# Patient Record
Sex: FEMALE | Race: WHITE | NOT HISPANIC OR LATINO | URBAN - METROPOLITAN AREA
[De-identification: names, ages, dates, MRNs, and addresses within clinical notes are randomized per-mention and may not be internally consistent; named-entity substitution may affect disease eponyms.]

---
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
## 2025-06-21 ENCOUNTER — HOSPITAL ENCOUNTER (EMERGENCY)
Facility: HOSPITAL | Age: 20
Discharge: HOME | End: 2025-06-21
Payer: COMMERCIAL

## 2025-06-21 VITALS
HEIGHT: 66 IN | BODY MASS INDEX: 38.89 KG/M2 | WEIGHT: 242 LBS | HEART RATE: 101 BPM | SYSTOLIC BLOOD PRESSURE: 138 MMHG | RESPIRATION RATE: 18 BRPM | OXYGEN SATURATION: 96 % | TEMPERATURE: 98.6 F | DIASTOLIC BLOOD PRESSURE: 89 MMHG

## 2025-06-21 DIAGNOSIS — Z23 IMMUNIZATION, TETANUS-DIPHTHERIA: ICD-10-CM
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
## 2025-06-21 DIAGNOSIS — S51.852A CAT BITE OF LEFT FOREARM, INITIAL ENCOUNTER: Primary | ICD-10-CM

## 2025-06-21 PROCEDURE — 90472 IMMUNIZATION ADMIN EACH ADD: CPT | Performed by: NURSE PRACTITIONER

## 2025-06-21 PROCEDURE — 99282 EMERGENCY DEPT VISIT SF MDM: CPT

## 2025-06-21 PROCEDURE — 99283 EMERGENCY DEPT VISIT LOW MDM: CPT | Mod: 25

## 2025-06-21 PROCEDURE — 2500000001 HC RX 250 WO HCPCS SELF ADMINISTERED DRUGS (ALT 637 FOR MEDICARE OP): Performed by: NURSE PRACTITIONER

## 2025-06-21 PROCEDURE — 90675 RABIES VACCINE IM: CPT | Mod: JZ | Performed by: NURSE PRACTITIONER

## 2025-06-21 PROCEDURE — 90377 RABIES IG HT&SOL HUMAN IM/SC: CPT | Mod: JW | Performed by: NURSE PRACTITIONER

## 2025-06-21 PROCEDURE — 96372 THER/PROPH/DIAG INJ SC/IM: CPT

## 2025-06-21 PROCEDURE — 90471 IMMUNIZATION ADMIN: CPT | Performed by: NURSE PRACTITIONER

## 2025-06-21 PROCEDURE — 90715 TDAP VACCINE 7 YRS/> IM: CPT | Performed by: NURSE PRACTITIONER

## 2025-06-21 PROCEDURE — 2500000004 HC RX 250 GENERAL PHARMACY W/ HCPCS (ALT 636 FOR OP/ED): Mod: JZ | Performed by: NURSE PRACTITIONER

## 2025-06-21 PROCEDURE — 2500000005 HC RX 250 GENERAL PHARMACY W/O HCPCS: Performed by: NURSE PRACTITIONER

## 2025-06-21 RX ORDER — CLINDAMYCIN HYDROCHLORIDE 150 MG/1
300 CAPSULE ORAL 3 TIMES DAILY
Qty: 60 CAPSULE | Refills: 0 | Status: SHIPPED | OUTPATIENT
Start: 2025-06-21 | End: 2025-07-01

## 2025-06-21 RX ORDER — CIPROFLOXACIN 500 MG/1
500 TABLET, FILM COATED ORAL ONCE
Status: COMPLETED | OUTPATIENT
Start: 2025-06-21 | End: 2025-06-21

## 2025-06-21 RX ORDER — IBUPROFEN 600 MG/1
600 TABLET, FILM COATED ORAL ONCE
Status: COMPLETED | OUTPATIENT
Start: 2025-06-21 | End: 2025-06-21

## 2025-06-21 RX ORDER — CLINDAMYCIN HYDROCHLORIDE 150 MG/1
300 CAPSULE ORAL ONCE
Status: COMPLETED | OUTPATIENT
Start: 2025-06-21 | End: 2025-06-21

## 2025-06-21 RX ORDER — CIPROFLOXACIN 500 MG/1
500 TABLET, FILM COATED ORAL 2 TIMES DAILY
Qty: 20 TABLET | Refills: 0 | Status: SHIPPED | OUTPATIENT
Start: 2025-06-21 | End: 2025-07-01

## 2025-06-21 RX ORDER — BACITRACIN ZINC 500 UNIT/G
1 OINTMENT IN PACKET (EA) TOPICAL ONCE
Status: COMPLETED | OUTPATIENT
Start: 2025-06-21 | End: 2025-06-21

## 2025-06-21 RX ADMIN — CIPROFLOXACIN 500 MG: 500 TABLET ORAL at 14:31

## 2025-06-21 RX ADMIN — RABIES VACCINE 1 ML: KIT at 14:52

## 2025-06-21 RX ADMIN — IBUPROFEN 600 MG: 600 TABLET ORAL at 14:31

## 2025-06-21 RX ADMIN — CLINDAMYCIN HYDROCHLORIDE 300 MG: 150 CAPSULE ORAL at 14:31

## 2025-06-21 RX ADMIN — TETANUS TOXOID, REDUCED DIPHTHERIA TOXOID AND ACELLULAR PERTUSSIS VACCINE, ADSORBED 0.5 ML: 5; 2.5; 8; 8; 2.5 SUSPENSION INTRAMUSCULAR at 14:31

## 2025-06-21 RX ADMIN — HUMAN RABIES VIRUS IMMUNE GLOBULIN 2175 UNITS: 150 INJECTION, SOLUTION INTRAMUSCULAR at 14:53

## 2025-06-21 RX ADMIN — BACITRACIN 1 APPLICATION: 500 OINTMENT TOPICAL at 15:25

## 2025-06-21 NOTE — ED PROVIDER NOTES
Limitations to History: None     HPI:      Tamanna Mason is a 20 y.o. who is otherwise healthy with unknown Tdap status presenting to ED today from work for evaluation of a cat bite.  Patient is a , she was attempting to x-ray a cat at work earlier today and she sustained a bite to the left distal forearm radial side.  Patient reports that although the cat is a house cat, it is overdue for rabies vaccine, last rabies vaccine 2023.  Denies pain.  Washed at the that hospital with soap and water.  Denies fever/chills, cough/cold symptoms, chest pain, shortness of breath, nausea/vomiting, abdominal pain, urinary symptoms, change in bowel habits or any other complaints.  No smoking, EtOH or IV drugs.  No PCP.    Additional History Obtained from: Triage/nursing notes reviewed    ------------------------------------------------------------------------------------------------------------------------------------------    VS: As documented in the triage note and EMR flowsheet from this visit were reviewed.    Physical Exam:  Gen:  well-appearing 20-year-old female, awake and alert, orient x 3.  Well-nourished hydrated.  Nontoxic looking  Head/Neck: NCAT, neck w/ FROM  Eyes: EOMI, PERRL, anicteric sclerae, noninjected conjunctivae  Ears: TMs clear b/l without sign of infection  Nose: Nares patent w/o rhinorrhea  Mouth:  MMM, no OP lesions noted  Heart: RRR no MRG  Lungs: CTA b/l no RRW, no increased work of breathing  Abdomen: soft, NT, ND, no HSM, no palpable masses  Musculoskeletal: JAXON x 4.  MSPs intact.  Skin intact.  No deformities  Neurologic: Alert, symmetrical facies, phonates clearly, moves all extremities equally, responsive to touch, ambulates normally   Skin: 4 superficial puncture wounds on the lateral aspect of the right distal forearm radial side, hemostasis maintained.  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  4 superficial puncture wounds to the dorsal surface of the left distal forearm radial side.  The cat is delinquent on rabies vaccine and the patient is requesting postexposure prophylaxis.  Vital signs within normal limits.  Afebrile.  Left upper extremity is neurovascularly intact.    Wound was washed with Hibiclens and saline, bacitracin and dressing applied.  Tetanus updated.  Wound is infiltrated with rabies immunoglobulin.  Given rabies vaccine.  Penicillin allergy, started on clindamycin and Cipro, both medications dosed at the bedside.  Given Motrin p.o. for pain.  May return to work.  Follow-up with Worker's Comp. in the next 2 to 3 days for wound check.  Will return to the emergency department on days 3, 7 and 14 (June 24, June 28, July 5) for subsequent doses of rabies vaccine.  Wound care discussed.  Return precautions red flags discussed.  All questions were entertained and answered.  Shared decision making conducted.  Patient verbalizes understanding of diagnosis and treatment plan.  Condition stable for discharge.    Diagnoses as of 06/21/25 1512   Cat bite of left forearm, initial encounter   Immunization, tetanus-diphtheria       EKG interpreted by myself (ED attending physician): Not ordered    Chronic Medical Conditions Significantly Affecting Care: None identified    External Records Reviewed: I reviewed recent and relevant outside records including: None    Discussion of Management with Other Providers: None    I discussed the patient/results with: None       Florina Samaniego, MAGGIE-CNP  06/21/25 1519

## 2025-06-21 NOTE — ED TRIAGE NOTES
Pt presents to department from home with c/o cat bite to L wrist. Pt states she is a . Pt states cat is overdue for rabies vaccine since 2023. Would like rabies vaccine. VSS. No acute distress

## 2025-06-21 NOTE — DISCHARGE INSTRUCTIONS
Cat bite to the left forearm.  You were given rabies immunoglobulin, you were also given rabies vaccine.  Rabies vaccine will be redosed on June 24, June 28 and July 5.  Report to the emergency department for the same injections, you will not need to see a provider but a nurse will give you the medication.  Keep the area clean and dry, watch for signs of  infection and return if such symptoms develop follow-up with Worker's Comp. in the next 2 to 3 days for wound evaluation.  Tylenol Motrin for Pain

## 2025-06-21 NOTE — Clinical Note
Tamanna Mason was seen and treated in our emergency department on 6/21/2025.  She may return to work on 06/22/2025.  May return to work Sunday or next regularly scheduled day.     If you have any questions or concerns, please don't hesitate to call.      Florina Samaniego, APRN-CNP

## 2025-06-24 ENCOUNTER — HOSPITAL ENCOUNTER (EMERGENCY)
Facility: HOSPITAL | Age: 20
Discharge: HOME | End: 2025-06-24
Payer: COMMERCIAL

## 2025-06-24 VITALS
BODY MASS INDEX: 38.57 KG/M2 | RESPIRATION RATE: 20 BRPM | WEIGHT: 240 LBS | HEART RATE: 71 BPM | OXYGEN SATURATION: 98 % | TEMPERATURE: 97.2 F | HEIGHT: 66 IN

## 2025-06-24 PROCEDURE — 99282 EMERGENCY DEPT VISIT SF MDM: CPT

## 2025-06-24 PROCEDURE — 99281 EMR DPT VST MAYX REQ PHY/QHP: CPT | Mod: 25

## 2025-06-24 PROCEDURE — 2500000004 HC RX 250 GENERAL PHARMACY W/ HCPCS (ALT 636 FOR OP/ED): Mod: JZ | Performed by: NURSE PRACTITIONER

## 2025-06-24 PROCEDURE — 90675 RABIES VACCINE IM: CPT | Mod: JZ | Performed by: NURSE PRACTITIONER

## 2025-06-24 PROCEDURE — 90471 IMMUNIZATION ADMIN: CPT | Performed by: NURSE PRACTITIONER

## 2025-06-24 RX ADMIN — RABIES VACCINE 1 ML: KIT at 11:20

## 2025-06-24 ASSESSMENT — LIFESTYLE VARIABLES
HAVE YOU EVER FELT YOU SHOULD CUT DOWN ON YOUR DRINKING: NO
EVER FELT BAD OR GUILTY ABOUT YOUR DRINKING: NO
EVER HAD A DRINK FIRST THING IN THE MORNING TO STEADY YOUR NERVES TO GET RID OF A HANGOVER: NO
TOTAL SCORE: 0
HAVE PEOPLE ANNOYED YOU BY CRITICIZING YOUR DRINKING: NO

## 2025-06-24 ASSESSMENT — PAIN - FUNCTIONAL ASSESSMENT: PAIN_FUNCTIONAL_ASSESSMENT: 0-10

## 2025-06-24 ASSESSMENT — PAIN SCALES - GENERAL: PAINLEVEL_OUTOF10: 0 - NO PAIN

## 2025-06-28 ENCOUNTER — HOSPITAL ENCOUNTER (EMERGENCY)
Facility: HOSPITAL | Age: 20
Discharge: HOME | End: 2025-06-28
Payer: COMMERCIAL

## 2025-06-28 PROCEDURE — 90471 IMMUNIZATION ADMIN: CPT | Performed by: NURSE PRACTITIONER

## 2025-06-28 PROCEDURE — 99281 EMR DPT VST MAYX REQ PHY/QHP: CPT | Mod: 25

## 2025-06-28 PROCEDURE — 90675 RABIES VACCINE IM: CPT | Mod: JZ | Performed by: NURSE PRACTITIONER

## 2025-06-28 PROCEDURE — 2500000004 HC RX 250 GENERAL PHARMACY W/ HCPCS (ALT 636 FOR OP/ED): Mod: JZ | Performed by: NURSE PRACTITIONER

## 2025-06-28 PROCEDURE — 99282 EMERGENCY DEPT VISIT SF MDM: CPT

## 2025-06-28 RX ADMIN — RABIES VACCINE 1 ML: KIT at 14:31

## 2025-07-29 ENCOUNTER — APPOINTMENT (OUTPATIENT)
Dept: PRIMARY CARE | Facility: CLINIC | Age: 20
End: 2025-07-29
Payer: COMMERCIAL